# Patient Record
Sex: FEMALE | Race: WHITE | Employment: STUDENT | RURAL
[De-identification: names, ages, dates, MRNs, and addresses within clinical notes are randomized per-mention and may not be internally consistent; named-entity substitution may affect disease eponyms.]

---
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
## 2019-03-29 ENCOUNTER — OFFICE VISIT (OUTPATIENT)
Dept: OTOLARYNGOLOGY | Facility: CLINIC | Age: 19
End: 2019-03-29

## 2019-03-29 VITALS — TEMPERATURE: 98.2 F | BODY MASS INDEX: 21.38 KG/M2 | HEIGHT: 66 IN | WEIGHT: 133 LBS

## 2019-03-29 DIAGNOSIS — H66.003 ACUTE SUPPURATIVE OTITIS MEDIA OF BOTH EARS WITHOUT SPONTANEOUS RUPTURE OF TYMPANIC MEMBRANES, RECURRENCE NOT SPECIFIED: ICD-10-CM

## 2019-03-29 DIAGNOSIS — H69.83 DYSFUNCTION OF BOTH EUSTACHIAN TUBES: Primary | ICD-10-CM

## 2019-03-29 DIAGNOSIS — M26.623 BILATERAL TEMPOROMANDIBULAR JOINT PAIN: ICD-10-CM

## 2019-03-29 PROCEDURE — 69210 REMOVE IMPACTED EAR WAX UNI: CPT | Performed by: PHYSICIAN ASSISTANT

## 2019-03-29 PROCEDURE — 99214 OFFICE O/P EST MOD 30 MIN: CPT | Performed by: PHYSICIAN ASSISTANT

## 2019-03-29 RX ORDER — METHYLPREDNISOLONE 4 MG/1
TABLET ORAL
Qty: 1 EACH | Refills: 0 | Status: SHIPPED | OUTPATIENT
Start: 2019-03-29 | End: 2019-06-04

## 2019-03-29 RX ORDER — ETHINYL ESTRADIOL/DROSPIRENONE 0.02-3(28)
1 TABLET ORAL DAILY
Refills: 11 | COMMUNITY
Start: 2019-03-18

## 2019-03-29 RX ORDER — CEFPROZIL 500 MG/1
TABLET, FILM COATED ORAL
COMMUNITY
Start: 2019-03-29 | End: 2019-06-04

## 2019-03-29 NOTE — PATIENT INSTRUCTIONS
Continue antibiotic and will start medrol pack. Follow TMJ precautions. Start using Flonase daily. Recheck in 6 weeks.    TemporoMandibular Joint Syndrome      TMJ is generally caused by the clenching or grinding of the teeth at night, but can happen during the day, also.    Symptoms of TMJ:    Pain in or around ear, jaw or neck area  Tenderness around ear or jaw  Popping/clicking from joint in jaw  Tension-type headaches  Stiffness/tightening of joint in jaw    Here are some things to avoid that can cause aggravation of your TMJ:    Do not chew gum.  Do not chew on hard candy.  Avoid eating hard to chew foods.  Decrease stress.    You will need to go on a soft food diet for two (2) weeks, especially when you are symptomatic of TMJ.    If taking any medicines for your TMJ, it is best to take those at night to decrease side effects of the medicine.    You will need to purchase a bite block or guard.  You can either purchase a custom fit guard from your dentist or you can purchase one from a local store in the sports section.

## 2019-03-31 RX ORDER — FLUTICASONE PROPIONATE 50 MCG
2 SPRAY, SUSPENSION (ML) NASAL DAILY
COMMUNITY
End: 2019-06-04 | Stop reason: SDUPTHER

## 2019-06-04 ENCOUNTER — PROCEDURE VISIT (OUTPATIENT)
Dept: OTOLARYNGOLOGY | Facility: CLINIC | Age: 19
End: 2019-06-04

## 2019-06-04 ENCOUNTER — OFFICE VISIT (OUTPATIENT)
Dept: OTOLARYNGOLOGY | Facility: CLINIC | Age: 19
End: 2019-06-04

## 2019-06-04 VITALS
TEMPERATURE: 98.3 F | DIASTOLIC BLOOD PRESSURE: 68 MMHG | HEART RATE: 61 BPM | BODY MASS INDEX: 21.34 KG/M2 | SYSTOLIC BLOOD PRESSURE: 104 MMHG | HEIGHT: 66 IN | OXYGEN SATURATION: 98 % | WEIGHT: 132.8 LBS

## 2019-06-04 DIAGNOSIS — H69.83 DYSFUNCTION OF BOTH EUSTACHIAN TUBES: Primary | ICD-10-CM

## 2019-06-04 DIAGNOSIS — M26.623 BILATERAL TEMPOROMANDIBULAR JOINT PAIN: ICD-10-CM

## 2019-06-04 PROCEDURE — 99213 OFFICE O/P EST LOW 20 MIN: CPT | Performed by: NURSE PRACTITIONER

## 2019-06-04 PROCEDURE — 92551 PURE TONE HEARING TEST AIR: CPT | Performed by: AUDIOLOGIST-HEARING AID FITTER

## 2019-06-04 PROCEDURE — 92555 SPEECH THRESHOLD AUDIOMETRY: CPT | Performed by: AUDIOLOGIST-HEARING AID FITTER

## 2019-06-04 PROCEDURE — 92567 TYMPANOMETRY: CPT | Performed by: AUDIOLOGIST-HEARING AID FITTER

## 2019-06-04 RX ORDER — FLUTICASONE PROPIONATE 50 MCG
2 SPRAY, SUSPENSION (ML) NASAL DAILY
Qty: 1 BOTTLE | Refills: 11 | Status: SHIPPED | OUTPATIENT
Start: 2019-06-04 | End: 2019-07-04

## 2019-06-04 NOTE — PROGRESS NOTES
PRIMARY CARE PROVIDER: Samaria Jensen MD  REFERRING PROVIDER: No ref. provider found    Chief Complaint   Patient presents with   • Ear Problem     ears       Subjective   History of Present Illness:  Minerva Steele is a 18 y.o. female who is here for follow up. She has had complaints of otalgia, ear fullness, ear pressure and decreased hearing. The symptoms are localized to both ears.  He has had moderate symptoms.  The symptoms have been resolved for the last several weeks. There have been no identified factors that aggravate the symptoms. The symptoms are improved by antibiotics, nasal sprays and steroids.  Today, she denies otalgia, otorrhea, dizziness, vertigo or decreased hearing.  She does have continued problems with TMJ.    Review of Systems:  Review of Systems   Constitutional: Negative for chills and fever.   HENT: Negative for ear discharge, ear pain, hearing loss, trouble swallowing and voice change.    Respiratory: Negative for cough and shortness of breath.    Cardiovascular: Negative for chest pain.   Gastrointestinal: Negative for diarrhea, nausea and vomiting.       Past History:  History reviewed. No pertinent past medical history.  Past Surgical History:   Procedure Laterality Date   • EAR TUBES     • TYMPANOPLASTY       History reviewed. No pertinent family history.  Social History     Tobacco Use   • Smoking status: Never Smoker   • Smokeless tobacco: Never Used   Substance Use Topics   • Alcohol use: No     Frequency: Never   • Drug use: Defer     Allergies:  Patient has no known allergies.    Current Outpatient Medications:   •  fluticasone (FLONASE) 50 MCG/ACT nasal spray, 2 sprays into the nostril(s) as directed by provider Daily for 30 days., Disp: 1 bottle, Rfl: 11  •  JAMES 3-0.02 MG per tablet, Take 1 tablet by mouth Daily., Disp: , Rfl: 11      Objective     Vital Signs:  Temp:  [98.3 °F (36.8 °C)] 98.3 °F (36.8 °C)  Heart Rate:  [61] 61  BP: (104)/(68) 104/68    Physical  Exam:  Physical Exam  CONSTITUTIONAL: well nourished, well-developed, alert, oriented, in no acute distress   COMMUNICATION AND VOICE: able to communicate normally, normal voice quality  HEAD: normocephalic, no lesions, atraumatic, no tenderness, no masses   FACE: appearance normal, no lesions, no tenderness, no deformities, facial motion symmetric  SALIVARY GLANDS: parotid glands with no tenderness, no swelling, no masses, submandibular glands with normal size, nontender  EYES: ocular motility normal, eyelids normal, orbits normal, no proptosis, conjunctiva normal , pupils equal, round   EARS:  Hearing: response to conversational voice normal bilaterally   External Ears: auricles without lesions  Otoscopic: Bilateral tympanic membrane appearance normal with myringosclerosis, no lesions, no perforation, normal mobility, no fluid  NOSE:  External Nose: structure normal, no tenderness on palpation, no nasal discharge, no lesions, no evidence of trauma, nostrils patent   ORAL:  Lips: upper and lower lips without lesion   NECK: neck appearance normal, no masses or tenderness  LYMPH NODES: no lymphadenopathy  CHEST/RESPIRATORY: respiratory effort normal, normal breath sounds   CARDIOVASCULAR: rate and rhythm normal, extremities without cyanosis or edema    NEUROLOGIC/PSYCHIATRIC: oriented to time, place and person, mood normal, affect appropriate, CN II-XII intact grossly      Results Review:       Assessment   Assessment:  1. Dysfunction of both eustachian tubes    2. Bilateral temporomandibular joint pain        Plan   Plan:    Her symptoms have resolved.  Continue Flonase- Rx sent to pharmacy.  Continue TMJ precautions.  Call for any problems or worsening symptoms.    QUALITY MEASURES    Body Mass Index Screening and Follow-Up Plan  Body mass index is 21.43 kg/m².  Patient's Body mass index is 21.43 kg/m². BMI is within normal parameters. No follow-up required..    Tobacco Use: Screening and Cessation  Intervention  Social History    Tobacco Use      Smoking status: Never Smoker      Smokeless tobacco: Never Used      Return in about 6 months (around 12/4/2019), or if symptoms worsen or fail to improve, for Recheck.    My findings and recommendations were discussed and questions were answered.     Becky Guerrero, LISA  06/04/19  2:15 PM